# Patient Record
Sex: FEMALE | Race: BLACK OR AFRICAN AMERICAN | Employment: FULL TIME | URBAN - METROPOLITAN AREA
[De-identification: names, ages, dates, MRNs, and addresses within clinical notes are randomized per-mention and may not be internally consistent; named-entity substitution may affect disease eponyms.]

---

## 2018-04-09 VITALS
RESPIRATION RATE: 16 BRPM | SYSTOLIC BLOOD PRESSURE: 113 MMHG | TEMPERATURE: 98.6 F | DIASTOLIC BLOOD PRESSURE: 75 MMHG | WEIGHT: 130 LBS | HEART RATE: 73 BPM | HEIGHT: 64 IN | OXYGEN SATURATION: 98 % | BODY MASS INDEX: 22.2 KG/M2

## 2018-04-09 PROCEDURE — 99283 EMERGENCY DEPT VISIT LOW MDM: CPT

## 2018-04-09 PROCEDURE — 99282 EMERGENCY DEPT VISIT SF MDM: CPT

## 2018-04-10 ENCOUNTER — HOSPITAL ENCOUNTER (EMERGENCY)
Age: 38
Discharge: HOME | End: 2018-04-10
Attending: EMERGENCY MEDICINE | Admitting: EMERGENCY MEDICINE
Payer: SELF-PAY

## 2018-04-10 DIAGNOSIS — H10.32 ACUTE CONJUNCTIVITIS OF LEFT EYE, UNSPECIFIED ACUTE CONJUNCTIVITIS TYPE: Primary | ICD-10-CM

## 2018-04-10 RX ORDER — ERYTHROMYCIN 5 MG/G
OINTMENT OPHTHALMIC
Qty: 3.5 G | Refills: 0 | Status: SHIPPED | OUTPATIENT
Start: 2018-04-10

## 2018-04-10 RX ORDER — PROPARACAINE HYDROCHLORIDE 5 MG/ML
1 SOLUTION/ DROPS OPHTHALMIC
Status: DISCONTINUED | OUTPATIENT
Start: 2018-04-10 | End: 2018-04-10 | Stop reason: HOSPADM

## 2018-04-10 NOTE — DISCHARGE INSTRUCTIONS
Return for pain, any vision changes, swelling, shortness of breath, vomiting, decreased fluid intake, weakness, numbness, dizziness, or any change or concerns. Pinkeye: Care Instructions  Your Care Instructions    Pinkeye is redness and swelling of the eye surface and the conjunctiva (the lining of the eyelid and the covering of the white part of the eye). Pinkeye is also called conjunctivitis. Pinkeye is often caused by infection with bacteria or a virus. Dry air, allergies, smoke, and chemicals are other common causes. Pinkeye often clears on its own in 7 to 10 days. Antibiotics only help if the pinkeye is caused by bacteria. Pinkeye caused by infection spreads easily. If an allergy or chemical is causing pinkeye, it will not go away unless you can avoid whatever is causing it. Follow-up care is a key part of your treatment and safety. Be sure to make and go to all appointments, and call your doctor if you are having problems. It's also a good idea to know your test results and keep a list of the medicines you take. How can you care for yourself at home? · Wash your hands often. Always wash them before and after you treat pinkeye or touch your eyes or face. · Use moist cotton or a clean, wet cloth to remove crust. Wipe from the inside corner of the eye to the outside. Use a clean part of the cloth for each wipe. · Put cold or warm wet cloths on your eye a few times a day if the eye hurts. · Do not wear contact lenses or eye makeup until the pinkeye is gone. Throw away any eye makeup you were using when you got pinkeye. Clean your contacts and storage case. If you wear disposable contacts, use a new pair when your eye has cleared and it is safe to wear contacts again. · If the doctor gave you antibiotic ointment or eyedrops, use them as directed. Use the medicine for as long as instructed, even if your eye starts looking better soon.  Keep the bottle tip clean, and do not let it touch the eye area.  · To put in eyedrops or ointment:  ¨ Tilt your head back, and pull your lower eyelid down with one finger. ¨ Drop or squirt the medicine inside the lower lid. ¨ Close your eye for 30 to 60 seconds to let the drops or ointment move around. ¨ Do not touch the ointment or dropper tip to your eyelashes or any other surface. · Do not share towels, pillows, or washcloths while you have pinkeye. When should you call for help? Call your doctor now or seek immediate medical care if:  ? · You have pain in your eye, not just irritation on the surface. ? · You have a change in vision or loss of vision. ? · You have an increase in discharge from the eye.   ? · Your eye has not started to improve or begins to get worse within 48 hours after you start using antibiotics. ? · Pinkeye lasts longer than 7 days. ? Watch closely for changes in your health, and be sure to contact your doctor if you have any problems. Where can you learn more? Go to http://caitlyn-kaitlin.info/. Enter Y392 in the search box to learn more about \"Pinkeye: Care Instructions. \"  Current as of: March 20, 2017  Content Version: 11.4  © 9289-8625 [employer]. Care instructions adapted under license by [employer] (which disclaims liability or warranty for this information). If you have questions about a medical condition or this instruction, always ask your healthcare professional. [place] [address] any warranty or liability for your use of this information.

## 2018-04-10 NOTE — ED PROVIDER NOTES
EMERGENCY DEPARTMENT HISTORY AND PHYSICAL EXAM    12:38 AM      Date: 4/10/2018  Patient Name: Pelon Alvarado    History of Presenting Illness     Chief Complaint   Patient presents with    Red Eye         History Provided By: Patient    Chief Complaint: Eye Redness; Eye Pain    Duration:  1 Week   Timing:  Constant  Location: Left eye   Quality: Pressure  Severity: N/A  Modifying Factors: None   Associated Symptoms: Eye Drainage/Crusting       Additional History (Context): Pelon Alvarado is a 30 y.o. female presenting to the ED c/o constant left eye pain and redness that started one week ago. Describes eye pain as pressure. Pt also notes drainage and crusting of left eye when she wakes up in the morning. Does not wear contacts. Denies any other symptoms or complaints. PCP: None    Current Outpatient Prescriptions   Medication Sig Dispense Refill    erythromycin (ILOTYCIN) ophthalmic ointment Apply 1/2 inch to b/l eye qid x 7 days. 3.5 g 0    cyclobenzaprine (FLEXERIL) 10 mg tablet Take 1 Tab by mouth three (3) times daily as needed for Muscle Spasm(s). 20 Tab 0       Past History     Past Medical History:  Past Medical History:   Diagnosis Date    Bacterial vaginosis        Past Surgical History:  History reviewed. No pertinent surgical history. Family History:  History reviewed. No pertinent family history. Social History:  Social History   Substance Use Topics    Smoking status: Never Smoker    Smokeless tobacco: None    Alcohol use No       Allergies: Allergies   Allergen Reactions    Macrobid [Nitrofurantoin Monohyd/M-Cryst] Rash and Itching         Review of Systems       Review of Systems   Constitutional: Negative for fever. HENT: Negative for congestion. Eyes: Positive for pain, discharge (crusting) and redness. Respiratory: Negative for cough and shortness of breath. Cardiovascular: Negative for chest pain.    Gastrointestinal: Negative for abdominal pain and vomiting. Musculoskeletal: Negative for back pain. Skin: Negative for rash. Neurological: Negative for light-headedness. All other systems reviewed and are negative. Physical Exam     Visit Vitals    /75    Pulse 73    Temp 98.6 °F (37 °C)    Resp 16    Ht 5' 4\" (1.626 m)    Wt 59 kg (130 lb)    SpO2 98%    BMI 22.31 kg/m2         Physical Exam   Constitutional: She is oriented to person, place, and time. She appears well-developed. HENT:   Head: Normocephalic. Mouth/Throat: Oropharynx is clear and moist.   Eyes: Pupils are equal, round, and reactive to light. Left conjunctiva is injected. Left Eye: Stained with fluorescein. Evaluated with Wood's lamp. No fluorescein uptake. No foreign body. Neck: Normal range of motion. Cardiovascular: Normal rate and normal heart sounds. No murmur heard. Pulmonary/Chest: Effort normal. She has no wheezes. She has no rales. Abdominal: Soft. There is no tenderness. Musculoskeletal: Normal range of motion. She exhibits no edema. Neurological: She is alert and oriented to person, place, and time. Skin: Skin is warm and dry. Nursing note and vitals reviewed. Diagnostic Study Results     Vitals:  Patient Vitals for the past 12 hrs:   Temp Pulse Resp BP SpO2   04/09/18 2357 98.6 °F (37 °C) 73 16 113/75 98 %         Medications ordered:   Medications - No data to display      Lab findings:  No results found for this or any previous visit (from the past 12 hour(s)). X-Ray, CT or other radiology findings or impressions:  No orders to display       Progress notes, Consult notes or additional Procedure notes:   Nl vision per pt. Non contact lens user. No emc, stable for dc and close f/u. No uptake/fb on wood's lamp exam, stable for dc and close f/u. Referral to optho for later today given. Stressed imp for f/u. Detailed ret inst given. Disposition:  Diagnosis:   1.  Acute conjunctivitis of left eye, unspecified acute conjunctivitis type        Disposition: Discharged     Follow-up Information     Follow up With Details Comments [address] Schedule an appointment as soon as possible for a visit in 2 days  [address] [facility] Schedule an appointment as soon as possible for a visit in 2 days  [address] [facility]  [phone]           Discharge Medication List as of 4/10/2018  1:20 AM      START taking these medications    Details   erythromycin (ILOTYCIN) ophthalmic ointment Apply 1/2 inch to b/l eye qid x 7 days. , Print, Disp-3.5 g, R-0         CONTINUE these medications which have NOT CHANGED    Details   cyclobenzaprine (FLEXERIL) 10 mg tablet Take 1 Tab by mouth three (3) times daily as needed for Muscle Spasm(s). , Print, Disp-20 Tab, R-0               Scribe Attestation     Sheila Kennedy acting as a scribe for and in the presence of Oneil Mukherjee MD      April 10, 2018 at 1:22 AM       Provider Attestation:      I personally performed the services described in the documentation, reviewed the documentation, as recorded by the scribe in my presence, and it accurately and completely records my words and actions.  April 10, 2018 at 1:22 AM - Oneil Mukherjee MD

## 2018-04-10 NOTE — LETTER
[address] [place] [address] [facility] 
[address] [facility] [address] 
[phone] Work/School Note Date: 4/9/2018 To Whom It May concern: 
 
Allan Duran was seen and treated today in the emergency room by the following provider(s): 
Attending Provider: Estela Rubalcava MD. Allan Duran - please excuse her from work 4/10/2018. Sincerely, Timbo Garza RN BSN ANEL NRP

## 2018-04-10 NOTE — ED NOTES
Pt instructed top use medication as prescribed, to follow up with opthalmology, and to return for worsening pain/redness/discomfort.

## 2021-05-19 ENCOUNTER — HOSPITAL ENCOUNTER (EMERGENCY)
Age: 41
Discharge: HOME | End: 2021-05-20
Attending: EMERGENCY MEDICINE

## 2021-05-19 DIAGNOSIS — H61.21 EXCESSIVE CERUMEN IN RIGHT EAR CANAL: Primary | ICD-10-CM

## 2021-05-19 PROCEDURE — 99282 EMERGENCY DEPT VISIT SF MDM: CPT

## 2021-05-19 NOTE — LETTER
NOTIFICATION RETURN TO WORK / SCHOOL 
 
5/20/2021 12:39 AM 
 
Ms. Richard Correa [address] [address] [place] [address] To Whom It May Concern: 
 
Richard Correa is currently under the care of [address] [facility] EMERGENCY DEPT. She will return to work/school on: 05/21/2021 Richard Correa may return to work/school with the following restrictions: None. If there are questions or concerns please have the patient contact our office.  
 
 
 
Sincerely, 
 
 
Benson Gabriel RN

## 2021-05-20 VITALS
DIASTOLIC BLOOD PRESSURE: 84 MMHG | OXYGEN SATURATION: 100 % | TEMPERATURE: 99.1 F | SYSTOLIC BLOOD PRESSURE: 135 MMHG | HEART RATE: 86 BPM | RESPIRATION RATE: 16 BRPM

## 2021-05-20 NOTE — ED TRIAGE NOTES
Patient c/o right ear fullness and loss of hearing and questions whether it may be an ear infection. Low grade temp in triage.

## 2021-05-20 NOTE — ED PROVIDER NOTES
HPI is a 16-year-old female who presents to the ER with complaint having a clogged right ear x1 week. Past Medical History:   Diagnosis Date    Bacterial vaginosis        No past surgical history on file. History reviewed. No pertinent family history. Social History     Socioeconomic History    Marital status: SINGLE     Spouse name: Not on file    Number of children: Not on file    Years of education: Not on file    Highest education level: Not on file   Occupational History    Not on file   Tobacco Use    Smoking status: Never Smoker   Substance and Sexual Activity    Alcohol use: No    Drug use: Not on file    Sexual activity: Yes     Birth control/protection: None   Other Topics Concern    Not on file   Social History Narrative    Not on file     Social Determinants of Health     Financial Resource Strain:     Difficulty of Paying Living Expenses:    Food Insecurity:     Worried About Running Out of Food in the Last Year:     [address] in the Last Year:    Transportation Needs:     Lack of Transportation (Medical):  Lack of Transportation (Non-Medical):    Physical Activity:     Days of Exercise per Week:     Minutes of Exercise per Session:    Stress:     Feeling of Stress :    Social Connections:     Frequency of Communication with Friends and Family:     Frequency of Social Gatherings with Friends and Family:     Attends Alevism Services:     Active Member of Clubs or Organizations:     Attends Club or Organization Meetings:     Marital Status:    Intimate Partner Violence:     Fear of Current or Ex-Partner:     Emotionally Abused:     Physically Abused:     Sexually Abused: ALLERGIES: Macrobid [nitrofurantoin monohyd/m-cryst]    Review of Systems   Constitutional: Negative. HENT: Positive for ear pain. Ear discharge: right ear clogged. Eyes: Negative. Respiratory: Negative. Cardiovascular: Negative. Gastrointestinal: Negative. Endocrine: Negative. Genitourinary: Negative. Musculoskeletal: Negative. Skin: Negative. Allergic/Immunologic: Negative. Neurological: Negative. Hematological: Negative. Psychiatric/Behavioral: Negative. All other systems reviewed and are negative. Vitals:    05/19/21 2209   BP: 135/84   Pulse: 86   Resp: 16   Temp: 99.1 °F (37.3 °C)   SpO2: 100%            Physical Exam  Vitals and nursing note reviewed. Constitutional:       General: She is not in acute distress. Appearance: She is well-developed. She is not diaphoretic. HENT:      Head: Normocephalic. Left Ear: External ear normal.      Mouth/Throat:      Pharynx: No oropharyngeal exudate. Eyes:      General: No scleral icterus. Right eye: No discharge. Left eye: No discharge. Conjunctiva/sclera: Conjunctivae normal.      Pupils: Pupils are equal, round, and reactive to light. Neck:      Thyroid: No thyromegaly. Vascular: No JVD. Trachea: No tracheal deviation. Cardiovascular:      Rate and Rhythm: Normal rate and regular rhythm. Heart sounds: Normal heart sounds. No murmur heard. No friction rub. No gallop. Pulmonary:      Effort: Pulmonary effort is normal. No respiratory distress. Breath sounds: Normal breath sounds. No stridor. No wheezing or rales. Chest:      Chest wall: No tenderness. Abdominal:      General: Bowel sounds are normal. There is no distension. Palpations: Abdomen is soft. There is no mass. Tenderness: There is no abdominal tenderness. There is no guarding or rebound. Musculoskeletal:         General: No tenderness. Normal range of motion. Cervical back: Normal range of motion and neck supple. Lymphadenopathy:      Cervical: No cervical adenopathy. Skin:     General: Skin is warm and dry. Coloration: Skin is not pale. Findings: No erythema or rash.    Neurological:      Mental Status: She is alert and oriented to person, place, and time. Cranial Nerves: No cranial nerve deficit. Motor: No abnormal muscle tone. Coordination: Coordination normal.      Deep Tendon Reflexes: Reflexes normal.          MDM       Procedures        Diagnosis: Cerumenosis    Disp:  D/C home. Rx: debrox. Return to ER prn. Dictation disclaimer:  Please note that this dictation was completed with [employer], the computer voice recognition software. Quite often unanticipated grammatical, syntax, homophones, and other interpretive errors are inadvertently transcribed by the computer software. Please disregard these errors. Please excuse any errors that have escaped final proofreading.

## 2021-05-20 NOTE — ED NOTES
I have reviewed discharge instructions with the patient. The patient verbalized understanding. Medication teaching given, to include name, dose, action, and side effects. Patient verbalized understanding of medications. Encouraged patient to voice any concerns with reassurance provided. Patient armband removed and shredded    Patient Discharged in stable condition. Current Discharge Medication List      START taking these medications    Details   carbamide peroxide (Debrox) 6.5 % otic solution Administer 5 Drops into each ear two (2) times a day.   Qty: 10 mL, Refills: 0  Start date: 5/19/2021

## 2021-05-20 NOTE — DISCHARGE INSTRUCTIONS
Patient Education        Earwax Blockage: Care Instructions  Your Care Instructions     Earwax is a natural substance that protects the ear canal. Normally, earwax drains from the ears and does not cause problems. Sometimes earwax builds up and hardens. Earwax blockage (also called cerumen impaction) can cause some loss of hearing and pain. When wax is tightly packed, you will need to have your doctor remove it. Follow-up care is a key part of your treatment and safety. Be sure to make and go to all appointments, and call your doctor if you are having problems. It's also a good idea to know your test results and keep a list of the medicines you take. How can you care for yourself at home? · Do not try to remove earwax with cotton swabs, fingers, or other objects. This can make the blockage worse and damage the eardrum. · If your doctor recommends that you try to remove earwax at home:  ? Soften and loosen the earwax with warm mineral oil. You also can try hydrogen peroxide mixed with an equal amount of room temperature water. Place 2 drops of the fluid, warmed to body temperature, in the ear two times a day for up to 5 days. ? Once the wax is loose and soft, all that is usually needed to remove it from the ear canal is a gentle, warm shower. Direct the water into the ear, then tip your head to let the earwax drain out. Dry your ear thoroughly with a hair dryer set on low. Hold the dryer several inches from your ear. ? If the warm mineral oil and shower do not work, use an over-the-counter wax softener. Read and follow all instructions on the label. After using the wax softener, use an ear syringe to gently flush the ear. Make sure the flushing solution is body temperature. Cool or hot fluids in the ear can cause dizziness. When should you call for help?    Call your doctor now or seek immediate medical care if:    · Pus or blood drains from your ear.     · Your ears are ringing or feel full.     · You have a loss of hearing. Watch closely for changes in your health, and be sure to contact your doctor if:    · You have pain or reduced hearing after 1 week of home treatment.     · You have any new symptoms, such as nausea or balance problems. Where can you learn more? Go to http://www.gray.com/  Enter Q495 in the search box to learn more about \"Earwax Blockage: Care Instructions. \"  Current as of: February 26, 2020               Content Version: 12.8  © 2006-2021 "[employer] Care instructions adapted under license by [employer] (which disclaims liability or warranty for this information). If you have questions about a medical condition or this instruction, always ask your healthcare professional. [address] any warranty or liability for your use of this information.

## 2024-03-24 ENCOUNTER — HOSPITAL ENCOUNTER (EMERGENCY)
Facility: HOSPITAL | Age: 44
Discharge: HOME | End: 2024-03-24
Attending: EMERGENCY MEDICINE

## 2024-03-24 VITALS
OXYGEN SATURATION: 100 % | HEIGHT: 60 IN | HEART RATE: 84 BPM | SYSTOLIC BLOOD PRESSURE: 139 MMHG | WEIGHT: 135 LBS | BODY MASS INDEX: 26.5 KG/M2 | TEMPERATURE: 97.7 F | RESPIRATION RATE: 18 BRPM | DIASTOLIC BLOOD PRESSURE: 96 MMHG

## 2024-03-24 DIAGNOSIS — B96.89 BACTERIAL VAGINOSIS: Primary | ICD-10-CM

## 2024-03-24 DIAGNOSIS — N76.0 BACTERIAL VAGINOSIS: Primary | ICD-10-CM

## 2024-03-24 LAB
APPEARANCE UR: CLEAR
BILIRUB UR QL: NEGATIVE
COLOR UR: YELLOW
GLUCOSE UR STRIP.AUTO-MCNC: NEGATIVE MG/DL
HCG UR QL: NEGATIVE
HGB UR QL STRIP: NEGATIVE
KETONES UR QL STRIP.AUTO: NEGATIVE MG/DL
LEUKOCYTE ESTERASE UR QL STRIP.AUTO: NEGATIVE
NITRITE UR QL STRIP.AUTO: NEGATIVE
PH UR STRIP: 6 (ref 5–8)
PROT UR STRIP-MCNC: NEGATIVE MG/DL
SERVICE CMNT-IMP: NORMAL
SP GR UR REFRACTOMETRY: 1.02 (ref 1–1.03)
UROBILINOGEN UR QL STRIP.AUTO: 0.2 EU/DL (ref 0.2–1)
WET PREP GENITAL: NORMAL

## 2024-03-24 PROCEDURE — 87661 TRICHOMONAS VAGINALIS AMPLIF: CPT

## 2024-03-24 PROCEDURE — 87491 CHLMYD TRACH DNA AMP PROBE: CPT

## 2024-03-24 PROCEDURE — 81025 URINE PREGNANCY TEST: CPT

## 2024-03-24 PROCEDURE — 99283 EMERGENCY DEPT VISIT LOW MDM: CPT

## 2024-03-24 PROCEDURE — 87591 N.GONORRHOEAE DNA AMP PROB: CPT

## 2024-03-24 PROCEDURE — 87210 SMEAR WET MOUNT SALINE/INK: CPT

## 2024-03-24 PROCEDURE — 81003 URINALYSIS AUTO W/O SCOPE: CPT

## 2024-03-24 RX ORDER — METRONIDAZOLE 500 MG/1
500 TABLET ORAL 2 TIMES DAILY
Qty: 14 TABLET | Refills: 0 | Status: SHIPPED | OUTPATIENT
Start: 2024-03-24 | End: 2024-03-31

## 2024-03-24 ASSESSMENT — PAIN - FUNCTIONAL ASSESSMENT: PAIN_FUNCTIONAL_ASSESSMENT: NONE - DENIES PAIN

## 2024-03-24 ASSESSMENT — LIFESTYLE VARIABLES
HOW MANY STANDARD DRINKS CONTAINING ALCOHOL DO YOU HAVE ON A TYPICAL DAY: PATIENT DOES NOT DRINK
HOW OFTEN DO YOU HAVE A DRINK CONTAINING ALCOHOL: NEVER

## 2024-03-24 NOTE — ED NOTES
Patient discharged at this time. This RN reviewed discharge instructions at this time with the patient.  patient verbalized understanding and does not have any questions. Pt ambulatory upon discharge, & in stable condition. Pt armband removed & shredded. Patient was ambulatory on discharged.

## 2024-03-24 NOTE — ED PROVIDER NOTES
EMERGENCY DEPARTMENT HISTORY AND PHYSICAL EXAM      Date: 3/24/2024  Patient Name: Gavi Plummer      History of Presenting Illness     Chief Complaint   Patient presents with    Vaginal Discharge    Dysuria       Location/Duration/Severity/Modifying factors   Chief Complaint   Patient presents with    Vaginal Discharge    Dysuria       HPI:  Gavi Plummer is a 43 y.o. female with PMH significant for UTI, Yeast infections presents with creamy white vaginal discharge, small x 1 week. Pt  denies painful urination, burning but feels \"tender\" when she wipes. No recent sexual partners. Also having lower pelvic cramping pain. No bleeding. Had prolonged menstrual period for 4 extra days. Denies fevers, chills Treatments attempted at home include AZO with no relief.    PCP: No primary care provider on file.    No current facility-administered medications for this encounter.     Current Outpatient Medications   Medication Sig Dispense Refill    metroNIDAZOLE (FLAGYL) 500 MG tablet Take 1 tablet by mouth 2 times daily for 7 days 14 tablet 0    carbamide peroxide (DEBROX) 6.5 % otic solution Place 5 drops in ear(s) 2 times daily (Patient not taking: Reported on 3/24/2024)      cyclobenzaprine (FLEXERIL) 10 MG tablet Take 10 mg by mouth 3 times daily as needed (Patient not taking: Reported on 3/24/2024)      erythromycin (ROMYCIN) 5 MG/GM ophthalmic ointment Apply 1/2 inch to b/l eye qid x 7 days. (Patient not taking: Reported on 3/24/2024)         Past History     Past Medical History:  Past Medical History:   Diagnosis Date    Bacterial vaginosis        Past Surgical History:  No past surgical history on file.    Family History:  No family history on file.    Social History:  Social History     Tobacco Use    Smoking status: Never   Substance Use Topics    Alcohol use: No       Allergies:  Allergies   Allergen Reactions    Nitrofurantoin Itching and Rash         Physical Exam     General: Patient is awake

## 2024-03-25 LAB
C TRACH RRNA SPEC QL NAA+PROBE: NEGATIVE
N GONORRHOEA RRNA SPEC QL NAA+PROBE: NEGATIVE
SPECIMEN SOURCE: NORMAL
T VAGINALIS RRNA SPEC QL NAA+PROBE: NEGATIVE